# Patient Record
Sex: MALE | Race: WHITE | NOT HISPANIC OR LATINO | URBAN - METROPOLITAN AREA
[De-identification: names, ages, dates, MRNs, and addresses within clinical notes are randomized per-mention and may not be internally consistent; named-entity substitution may affect disease eponyms.]

---
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
## 2023-02-13 ENCOUNTER — OUTPATIENT (OUTPATIENT)
Dept: OUTPATIENT SERVICES | Facility: HOSPITAL | Age: 18
LOS: 1 days | End: 2023-02-13
Payer: COMMERCIAL

## 2023-02-13 DIAGNOSIS — M25.552 PAIN IN LEFT HIP: ICD-10-CM

## 2023-02-13 PROCEDURE — 73502 X-RAY EXAM HIP UNI 2-3 VIEWS: CPT | Mod: LT

## 2023-02-13 PROCEDURE — 73502 X-RAY EXAM HIP UNI 2-3 VIEWS: CPT | Mod: 26,LT

## 2023-02-14 DIAGNOSIS — M25.552 PAIN IN LEFT HIP: ICD-10-CM

## 2023-11-22 ENCOUNTER — APPOINTMENT (OUTPATIENT)
Dept: OTOLARYNGOLOGY | Facility: CLINIC | Age: 18
End: 2023-11-22
Payer: COMMERCIAL

## 2023-11-22 VITALS — HEIGHT: 71 IN | BODY MASS INDEX: 22.4 KG/M2 | WEIGHT: 160 LBS

## 2023-11-22 DIAGNOSIS — R04.0 EPISTAXIS: ICD-10-CM

## 2023-11-22 PROCEDURE — 99204 OFFICE O/P NEW MOD 45 MIN: CPT | Mod: 25

## 2023-11-22 PROCEDURE — 30901 CONTROL OF NOSEBLEED: CPT | Mod: LT

## 2023-11-22 RX ORDER — OXYMETAZOLINE HCL 0.05 %
0.05 AEROSOL, MIST NASAL
Qty: 1 | Refills: 0 | Status: ACTIVE | COMMUNITY
Start: 2023-11-22 | End: 1900-01-01

## 2023-11-22 RX ORDER — BACITRACIN 500 [IU]/G
500 OINTMENT TOPICAL DAILY
Qty: 10 | Refills: 1 | Status: ACTIVE | COMMUNITY
Start: 2023-11-22 | End: 1900-01-01

## 2023-11-23 ENCOUNTER — APPOINTMENT (OUTPATIENT)
Dept: OTOLARYNGOLOGY | Facility: CLINIC | Age: 18
End: 2023-11-23

## 2023-12-20 ENCOUNTER — APPOINTMENT (OUTPATIENT)
Dept: OTOLARYNGOLOGY | Facility: CLINIC | Age: 18
End: 2023-12-20

## 2023-12-28 ENCOUNTER — APPOINTMENT (OUTPATIENT)
Dept: OTOLARYNGOLOGY | Facility: CLINIC | Age: 18
End: 2023-12-28

## 2024-02-19 ENCOUNTER — APPOINTMENT (OUTPATIENT)
Dept: ORTHOPEDIC SURGERY | Facility: CLINIC | Age: 19
End: 2024-02-19
Payer: COMMERCIAL

## 2024-02-19 PROCEDURE — 73521 X-RAY EXAM HIPS BI 2 VIEWS: CPT

## 2024-02-19 PROCEDURE — 99203 OFFICE O/P NEW LOW 30 MIN: CPT

## 2024-02-19 NOTE — ASSESSMENT
[FreeTextEntry1] : 19-year-old gentleman with left groin pain.  Radiographs suggest a juvenile neck-shaft angle.  Left hip radiograph also demonstrates a deficit deficiency in the left femoral head.  Because of this deficiency realignment of the femoral neck to a more adult anatomy is not going to be possible.  Moreover because of the neck-shaft angle a somewhat shallow acetabulum with edge loading of his hip joint.  My recommendation would be to modify his activities.  Do not recommending squats or lifting in that fashion.  Similarly he can return to sport but he noticed that certain maneuvers such as landing in a squatted position elicits discomfort he should avoid this activity in that sport.  Wearing to have him return in 2 months to see if his pain subsides with the cessation of weight lifting activities such as squats or leg presses.  Copy of my note is CCed to his primary medical doctor.  If he still has pain at that time we will work him up further with either a CT or an MRI to fully catalogs anatomy.

## 2024-02-19 NOTE — HISTORY OF PRESENT ILLNESS
[de-identified] : 19-year-old gentleman presents to my office for evaluation of left groin pain.  He notes that 3 years ago he had some symptoms while playing basketball for high school team.  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  He is an avid weightlifter and has noted increasing pain with weight lifting.  Pain is essentially along his inguinal crease.  Denies any trauma.  Denies any sensory complaints or radiation associated with the pain.  Does not routinely take medication for the pain.  The more he weight lifts the worst the pain has gotten.  Past medical history: Denies all with regular medical follow-up  Primary medical doctor: Dr. Krzysztof Chavez at [address] [place] [address]

## 2024-02-19 NOTE — IMAGING
[de-identified] : Pleasant young man walks into my office in no distress.  Easily gets onto and off exam table without assistance.  No antalgia when he walks.  No limp.  Physical examination: Left hip: No tenderness palpation over the bony prominences of his pelvis and hip joint including greater trochanteric bursa.  No tenderness along the inguinal crease.  No pain with forced internal rotation of his hip joint at full extension.  No pain at 90 degrees of flexion.  However, with his hip abducted and internally rotated he has significant pain about his inguinal crease.  This does not recur with external rotation.  There are no lymph nodes in the inguinal crease.  Gait: Nonantalgic.  No limp.  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  The left hip demonstrate a deficit of the femoral head primarily along the anterior lateral aspects of the head.  This is consistent with an old injury.  No evidence of hip arthritis or avascular necrosis.  There is a shallowness to his acetabular bilaterally secondary to the juvenile neck-shaft anatomy.

## 2024-02-25 ENCOUNTER — NON-APPOINTMENT (OUTPATIENT)
Age: 19
End: 2024-02-25

## 2024-05-24 ENCOUNTER — TRANSCRIPTION ENCOUNTER (OUTPATIENT)
Age: 19
End: 2024-05-24

## 2024-05-24 ENCOUNTER — NON-APPOINTMENT (OUTPATIENT)
Age: 19
End: 2024-05-24

## 2024-05-24 ENCOUNTER — APPOINTMENT (OUTPATIENT)
Dept: ORTHOPEDIC SURGERY | Facility: CLINIC | Age: 19
End: 2024-05-24
Payer: COMMERCIAL

## 2024-05-24 PROCEDURE — 99213 OFFICE O/P EST LOW 20 MIN: CPT

## 2024-05-24 NOTE — ASSESSMENT
[FreeTextEntry1] : 19-year-old gentleman with symptomatic DDH.  We reviewed the x-rays with the patient.  We had a long discussion about treatment options.  I think that he probably would benefit from surgical correction of this deformity.  This is beyond the scope of my practice.  [facility] and have also given him appointment with Dr. Allen in our group to discuss surgical treatment.  Answered questions to the best of my ability.  We spent more than 30 minutes talking about his condition and treatment options.

## 2024-05-24 NOTE — HISTORY OF PRESENT ILLNESS
[de-identified] : 19-year-old gentleman known to my office with bilateral DDH returns for follow-up with complaints of left groin pain despite recommendations for activity modification.  Prior radiographic assessment suggested a shallow acetabulum of the juvenile neck-shaft angle.  Patient cut back on his weightlifting and despite this has had significant left groin pain.  He does not require cane or assistive device to walk.  Managing his discomfort with NSAIDs.

## 2024-05-24 NOTE — IMAGING
[de-identified] : Radiographs we reviewed with the patient.  Neck-shaft angle greater than 145 degrees with a shallow acetabulum bilaterally.  Patient also has a deficit in the femoral head on the left side.

## 2024-05-28 ENCOUNTER — APPOINTMENT (OUTPATIENT)
Dept: ORTHOPEDIC SURGERY | Facility: CLINIC | Age: 19
End: 2024-05-28
Payer: COMMERCIAL

## 2024-05-28 DIAGNOSIS — M21.852 OTHER SPECIFIED ACQUIRED DEFORMITIES OF LEFT THIGH: ICD-10-CM

## 2024-05-28 PROCEDURE — 99213 OFFICE O/P EST LOW 20 MIN: CPT

## 2024-05-28 NOTE — PHYSICAL EXAM
[Not Examined] : not examined [Normal] : The patient is moving all extremities spontaneously without any gross neurologic deficits. They walk with a fluid nonantalgic gait. There are equal and symmetric deep tendon reflexes in the upper and lower extremities bilaterally. There is gross intact sensation to soft and light touch in the bilateral upper and lower extremities [de-identified] : +FABIR FROM of hip islt intact motor

## 2024-05-28 NOTE — HISTORY OF PRESENT ILLNESS
[FreeTextEntry1] : 18 y/o with left hip pain.  intermittent.  Xrays consistent with B/L DDH left worse then right.  Patient has yet to get MRI or PT.    No fever, rash, or recent illness.  No joint swelling/stiffness.  No eye pain/redness/change in vision.  No sores in the mouth or nose.  No difficulty swallowing.  No chest pain or shortness of breath.  No abdominal complaints or weight loss.  No weakness.  No headaches or focal neurological deficits.  No urinary changes.  No other new symptoms.

## 2024-06-05 ENCOUNTER — APPOINTMENT (OUTPATIENT)
Dept: MRI IMAGING | Facility: CLINIC | Age: 19
End: 2024-06-05
Payer: COMMERCIAL

## 2024-06-05 PROCEDURE — 73721 MRI JNT OF LWR EXTRE W/O DYE: CPT | Mod: LT

## 2024-06-18 ENCOUNTER — APPOINTMENT (OUTPATIENT)
Dept: ORTHOPEDIC SURGERY | Facility: CLINIC | Age: 19
End: 2024-06-18
Payer: COMMERCIAL

## 2024-06-18 DIAGNOSIS — Q65.89 OTHER SPECIFIED CONGENITAL DEFORMITIES OF HIP: ICD-10-CM

## 2024-06-18 PROCEDURE — 99213 OFFICE O/P EST LOW 20 MIN: CPT

## 2024-06-18 NOTE — HISTORY OF PRESENT ILLNESS
[FreeTextEntry1] : 18 y/o with left hip pain and DDH.  here s/p mri  No fever, rash, or recent illness.  No swelling/stiffness.  No eye pain/redness/change in vision.  No sores in the mouth or nose.  No difficulty swallowing.  No chest pain or shortness of breath.  No abdominal complaints or weight loss.  No weakness.  No headaches or focal neurological deficits.  No urinary changes.  No other new symptoms.

## 2024-06-18 NOTE — PHYSICAL EXAM
[Not Examined] : not examined [Normal] : The patient is moving all extremities spontaneously without any gross neurologic deficits. They walk with a fluid nonantalgic gait. There are equal and symmetric deep tendon reflexes in the upper and lower extremities bilaterally. There is gross intact sensation to soft and light touch in the bilateral upper and lower extremities [de-identified] : islt intact motor

## 2024-08-13 ENCOUNTER — APPOINTMENT (OUTPATIENT)
Dept: ORTHOPEDIC SURGERY | Facility: CLINIC | Age: 19
End: 2024-08-13

## 2024-09-10 ENCOUNTER — APPOINTMENT (OUTPATIENT)
Dept: ORTHOPEDIC SURGERY | Facility: CLINIC | Age: 19
End: 2024-09-10

## 2024-10-01 ENCOUNTER — NON-APPOINTMENT (OUTPATIENT)
Age: 19
End: 2024-10-01

## 2024-10-11 ENCOUNTER — OUTPATIENT (OUTPATIENT)
Dept: OUTPATIENT SERVICES | Facility: HOSPITAL | Age: 19
LOS: 1 days | End: 2024-10-11
Payer: COMMERCIAL

## 2024-10-11 DIAGNOSIS — Z00.8 ENCOUNTER FOR OTHER GENERAL EXAMINATION: ICD-10-CM

## 2024-10-11 DIAGNOSIS — R51.9 HEADACHE, UNSPECIFIED: ICD-10-CM

## 2024-10-11 PROCEDURE — 70544 MR ANGIOGRAPHY HEAD W/O DYE: CPT

## 2024-10-11 PROCEDURE — 70551 MRI BRAIN STEM W/O DYE: CPT

## 2024-10-11 PROCEDURE — 70551 MRI BRAIN STEM W/O DYE: CPT | Mod: 26

## 2024-10-11 PROCEDURE — 70544 MR ANGIOGRAPHY HEAD W/O DYE: CPT | Mod: 26,59

## 2024-10-12 DIAGNOSIS — R51.9 HEADACHE, UNSPECIFIED: ICD-10-CM
